# Patient Record
Sex: FEMALE | Race: OTHER | HISPANIC OR LATINO
[De-identification: names, ages, dates, MRNs, and addresses within clinical notes are randomized per-mention and may not be internally consistent; named-entity substitution may affect disease eponyms.]

---

## 2018-06-18 ENCOUNTER — RESULT REVIEW (OUTPATIENT)
Age: 28
End: 2018-06-18

## 2020-05-18 ENCOUNTER — TRANSCRIPTION ENCOUNTER (OUTPATIENT)
Age: 30
End: 2020-05-18

## 2020-07-29 ENCOUNTER — OUTPATIENT (OUTPATIENT)
Dept: INPATIENT UNIT | Facility: HOSPITAL | Age: 30
LOS: 1 days | Discharge: HOME | End: 2020-07-29
Payer: COMMERCIAL

## 2020-07-29 ENCOUNTER — EMERGENCY (EMERGENCY)
Facility: HOSPITAL | Age: 30
LOS: 1 days | Discharge: STILL ADMITTED OR ADMINISTRATIVE | End: 2020-07-29
Admitting: EMERGENCY MEDICINE

## 2020-07-29 VITALS
RESPIRATION RATE: 18 BRPM | WEIGHT: 123.9 LBS | HEIGHT: 62 IN | TEMPERATURE: 99 F | SYSTOLIC BLOOD PRESSURE: 102 MMHG | HEART RATE: 90 BPM | DIASTOLIC BLOOD PRESSURE: 64 MMHG

## 2020-07-29 VITALS
HEART RATE: 92 BPM | OXYGEN SATURATION: 100 % | SYSTOLIC BLOOD PRESSURE: 119 MMHG | DIASTOLIC BLOOD PRESSURE: 83 MMHG | TEMPERATURE: 99 F | RESPIRATION RATE: 18 BRPM

## 2020-07-29 VITALS — TEMPERATURE: 99 F

## 2020-07-29 DIAGNOSIS — O26.899 OTHER SPECIFIED PREGNANCY RELATED CONDITIONS, UNSPECIFIED TRIMESTER: ICD-10-CM

## 2020-07-29 DIAGNOSIS — Z3A.00 WEEKS OF GESTATION OF PREGNANCY NOT SPECIFIED: ICD-10-CM

## 2020-07-29 LAB
APPEARANCE UR: CLEAR — SIGNIFICANT CHANGE UP
APTT BLD: 28.5 SEC — SIGNIFICANT CHANGE UP (ref 27–36.3)
BASOPHILS # BLD AUTO: 0.03 K/UL — SIGNIFICANT CHANGE UP (ref 0–0.2)
BASOPHILS NFR BLD AUTO: 0.3 % — SIGNIFICANT CHANGE UP (ref 0–2)
BILIRUB UR-MCNC: NEGATIVE — SIGNIFICANT CHANGE UP
BLD GP AB SCN SERPL QL: NEGATIVE — SIGNIFICANT CHANGE UP
BLOOD UR QL VISUAL: NEGATIVE — SIGNIFICANT CHANGE UP
COLOR SPEC: SIGNIFICANT CHANGE UP
EOSINOPHIL # BLD AUTO: 0.38 K/UL — SIGNIFICANT CHANGE UP (ref 0–0.5)
EOSINOPHIL NFR BLD AUTO: 4.1 % — SIGNIFICANT CHANGE UP (ref 0–6)
FIBRINOGEN PPP-MCNC: 597 MG/DL — HIGH (ref 300–520)
GLUCOSE UR-MCNC: NEGATIVE — SIGNIFICANT CHANGE UP
HCT VFR BLD CALC: 34.3 % — LOW (ref 34.5–45)
HGB BLD-MCNC: 12.1 G/DL — SIGNIFICANT CHANGE UP (ref 11.5–15.5)
IMM GRANULOCYTES NFR BLD AUTO: 0.5 % — SIGNIFICANT CHANGE UP (ref 0–1.5)
INR BLD: 0.97 — SIGNIFICANT CHANGE UP (ref 0.88–1.17)
KETONES UR-MCNC: NEGATIVE — SIGNIFICANT CHANGE UP
LEUKOCYTE ESTERASE UR-ACNC: NEGATIVE — SIGNIFICANT CHANGE UP
LYMPHOCYTES # BLD AUTO: 2.07 K/UL — SIGNIFICANT CHANGE UP (ref 1–3.3)
LYMPHOCYTES # BLD AUTO: 22.2 % — SIGNIFICANT CHANGE UP (ref 13–44)
MCHC RBC-ENTMCNC: 31.4 PG — SIGNIFICANT CHANGE UP (ref 27–34)
MCHC RBC-ENTMCNC: 35.3 % — SIGNIFICANT CHANGE UP (ref 32–36)
MCV RBC AUTO: 89.1 FL — SIGNIFICANT CHANGE UP (ref 80–100)
MONOCYTES # BLD AUTO: 0.39 K/UL — SIGNIFICANT CHANGE UP (ref 0–0.9)
MONOCYTES NFR BLD AUTO: 4.2 % — SIGNIFICANT CHANGE UP (ref 2–14)
NEUTROPHILS # BLD AUTO: 6.41 K/UL — SIGNIFICANT CHANGE UP (ref 1.8–7.4)
NEUTROPHILS NFR BLD AUTO: 68.7 % — SIGNIFICANT CHANGE UP (ref 43–77)
NITRITE UR-MCNC: NEGATIVE — SIGNIFICANT CHANGE UP
NRBC # FLD: 0 K/UL — SIGNIFICANT CHANGE UP (ref 0–0)
PH UR: 6 — SIGNIFICANT CHANGE UP (ref 5–8)
PLATELET # BLD AUTO: 236 K/UL — SIGNIFICANT CHANGE UP (ref 150–400)
PMV BLD: 12.1 FL — SIGNIFICANT CHANGE UP (ref 7–13)
PROT UR-MCNC: NEGATIVE — SIGNIFICANT CHANGE UP
PROTHROM AB SERPL-ACNC: 11.1 SEC — SIGNIFICANT CHANGE UP (ref 9.8–13.1)
RBC # BLD FETUS AUTO: 0.9 — CRITICAL HIGH
RBC # BLD: 3.85 M/UL — SIGNIFICANT CHANGE UP (ref 3.8–5.2)
RBC # FLD: 12.5 % — SIGNIFICANT CHANGE UP (ref 10.3–14.5)
RH IG SCN BLD-IMP: NEGATIVE — SIGNIFICANT CHANGE UP
RH IG SCN BLD-IMP: NEGATIVE — SIGNIFICANT CHANGE UP
SP GR SPEC: 1.01 — SIGNIFICANT CHANGE UP (ref 1–1.04)
UROBILINOGEN FLD QL: NORMAL — SIGNIFICANT CHANGE UP
WBC # BLD: 9.33 K/UL — SIGNIFICANT CHANGE UP (ref 3.8–10.5)
WBC # FLD AUTO: 9.33 K/UL — SIGNIFICANT CHANGE UP (ref 3.8–10.5)

## 2020-07-29 PROCEDURE — 99203 OFFICE O/P NEW LOW 30 MIN: CPT | Mod: 25

## 2020-07-29 PROCEDURE — 76816 OB US FOLLOW-UP PER FETUS: CPT | Mod: 26

## 2020-07-29 PROCEDURE — 76817 TRANSVAGINAL US OBSTETRIC: CPT | Mod: 26

## 2020-07-29 NOTE — OB PROVIDER TRIAGE NOTE - NSOBPROVIDERNOTE_OBGYN_ALL_OB_FT
Labs resulted above. Patient is RH-(Negative); Rhogam ordered.   Dr Alva notified of above findings  Patient cleared to be discharge home after Rhogam administration. Labs resulted above. Patient is RH-(Negative); Rhogam ordered.   Dr Alva notified of above findings  Patient cleared to be discharge home after Rhogam administration.  Pt s/p rhogam administration. Information card provided to patient  Pt states OB appointment on Friday 7/31/20

## 2020-07-29 NOTE — OB PROVIDER TRIAGE NOTE - HISTORY OF PRESENT ILLNESS
[facility] Patient by default. [facility] Provider: Dr Roxy De La Torre  28 y/o  female, para 0010 @ 15+6 wks gestation, single IUP.  Presents to triage with c/o abdominal cramping all day yesterday. Woke up this morning at 12:30 am with ~1 TBSP of bright red vaginal bleeding in underwear that has since stopped, now sees brown spotting when wiped.    Pt denies any leakage of fluid. Denies any fetal movements yet for GA.     A/P Complications: Denies  Blood Transfusion: Patient will accept blood    Covid Assessment: Pt denies any: fever, chills, cough, SOB or any recent known exposure to Covid-19.    Allergies: NKDA  Meds: PNV  PMH: Denies  PSH: Denies  OBgynHx    2020-Chemical pregnancy  Pt denies any h/o: Abn. PAP, ovarian cysts, uterine fibroids, breast problems, STDs/STIs  PSY: Dx with   EtOH/ Smoke/ Recreational substance use: denies  FH: Not relevant to HPI  H/W/BMI: 6"/#/ [facility] Patient by default. [facility]NC Provider: Dr Roxy De La Torre  30 y/o  female, para 0010 @ 15+6 wks gestation, single IUP.  Presents to triage with c/o abdominal cramping all day yesterday. Woke up this morning at 12:30 am with ~1 TBSP of bright red vaginal bleeding in underwear that has since stopped, now sees brown spotting when wiped.    Pt denies any leakage of fluid. Denies any fetal movements yet for GA.     A/P Complications: Denies  Blood Transfusion: Patient will accept blood    Covid Assessment: Pt denies any: fever, chills, cough, SOB or any recent known exposure to Covid-19.    Allergies: NKDA  Meds: PNV  PMH: Denies  PSH: Denies  OBgynHx    2020-Chemical pregnancy  Pt denies any h/o: Abn. PAP, ovarian cysts, uterine fibroids, breast problems, STDs/STIs  PSY: Dx with Anxiety many years ago, denies any meds or therapy.   EtOH/ Smoke/ Recreational substance use: denies  FH: Not relevant to HPI  H/W/BMI: 62"/124#/22.7

## 2020-07-29 NOTE — OB PROVIDER TRIAGE NOTE - NS_OBGYNHISTORY_OBGYN_ALL_OB_FT
2020-Chemical pregnancy  Pt denies any h/o: Abn. PAP, ovarian cysts, uterine fibroids, breast problems, STDs/STIs

## 2020-07-29 NOTE — ED ADULT TRIAGE NOTE - CHIEF COMPLAINT QUOTE
Pt. reports she is 15 weeks and 6 days pregnant, , c/o vaginal bleeding when wiping. Also endorses intermittent abd cramping. Denies saturating pads. States she is due on . Spoke with Onelia from L&D who states pt. to be seen in L&D.

## 2020-07-29 NOTE — ED ADULT NURSE NOTE - NS ED NURSE NOTE DISPO AOU DETAILS FT
No acute distress at present. Pt. A&Ox4, ambulatory. Respirations even & unlabored on room air. Spoke with Onelia from L&D who states pt. to be seen in L&D. Pt. transported by ED tech.

## 2020-07-29 NOTE — OB PROVIDER TRIAGE NOTE - PLAN OF CARE
Patient cleared to be discharge home by Dr Alva  Follow up with Dr De La Torre on Friday 7/31/20 for routine OB care  Pelvic rest until cleared by OB  Return to triage for: leakage of fluid, vaginal bleeding, abdominal pain/cramping, or for any other concerns.   Bleeding precautions and pelvic rest reviewed with patient. Patient verbalized understanding

## 2020-07-29 NOTE — OB PROVIDER TRIAGE NOTE - ADDITIONAL INSTRUCTIONS
Follow up with Dr De L aTorre on Friday 7/31/20 for routine OB care  Pelvic rest until cleared by OB  Return to triage for: leakage of fluid, vaginal bleeding, abdominal pain/cramping, or for any other concerns.

## 2020-07-29 NOTE — OB PROVIDER TRIAGE NOTE - NSHPPHYSICALEXAM_GEN_ALL_CORE
30 y/o  female, para 0010 @ 15+6 wks gestation, single IUP.  Vaginal Bleeding  Gen: NAD; A+O x 3  Cardiac: S1/S2, R/R/R  Pulm: CTAB  Abdomen: Gravid, soft, non-tender  VS-BP: 102/64, P 90, RR 18, T 37.1, Pain 5/10  SSE: Scant dark brown leukorrhea noted on vaginal wall, no active bleeding noted, cervix appears C/L/P. Nitrazine Negative, Fern Negative  TVUS: Cervical length 3.84-4.04 cm, no funneling, no dynamic changes, Right Marginal Previa   Limited TAS: SLIUP, Cephalic, Posterior low lying placenta noted, multiple gross fetal movements visualized,  bpm with +color doppler flow, MVP 5.59 cm, size = dates, .95g  Plan:  CBC/Coags/T&S/KB/UA

## 2020-07-29 NOTE — OB PROVIDER TRIAGE NOTE - NSHPLABSRESULTS_GEN_ALL_CORE
Vital Signs Last 24 Hrs  T(C): 37.1 (29 Jul 2020 01:21), Max: 37.1 (29 Jul 2020 00:38)  T(F): 98.8 (29 Jul 2020 01:21), Max: 98.8 (29 Jul 2020 00:38)  HR: 90 (29 Jul 2020 01:21) (90 - 92)  BP: 102/64 (29 Jul 2020 01:21) (102/64 - 119/83)  BP(mean): --  RR: 18 (29 Jul 2020 01:21) (18 - 18)  SpO2: 100% (29 Jul 2020 00:38) (100% - 100%) Vital Signs Last 24 Hrs  T(C): 37.1 (:21), Max: 37.1 (2020 00:38)  T(F): 98.8 (:), Max: 98.8 (2020 00:38)  HR: 90 (:) (90 - 92)  BP: 102/64 (:) (102/64 - 119/83)  BP(mean): --  RR: 18 () (18 - 18)  SpO2: 100% (:38) (100% - 100%)                          12.1   9.33  )-----------( 236      ( 30 )             34.3     PT/INR - ( 30 )   PT: 11.1 SEC;   INR: 0.97          PTT - ( 30 )  PTT:28.5 SEC    Fibrinogen: 597    Urinalysis Basic - (  )    Color: LIGHT YELLOW / Appearance: CLEAR / S.013 / pH: 6.0  Gluc: NEGATIVE / Ketone: NEGATIVE  / Bili: NEGATIVE / Urobili: NORMAL   Blood: NEGATIVE / Protein: NEGATIVE / Nitrite: NEGATIVE   Leuk Esterase: NEGATIVE / RBC: x / WBC x   Sq Epi: x / Non Sq Epi: x / Bacteria: x    Blood Type: Vital Signs Last 24 Hrs  T(C): 37.1 (2020 01:21), Max: 37.1 (2020 00:38)  T(F): 98.8 (:21), Max: 98.8 (2020 00:38)  HR: 90 (:) (90 - 92)  BP: 102/64 (:) (102/64 - 119/83)  BP(mean): --  RR: 18 () (18 - 18)  SpO2: 100% (:38) (100% - 100%)                          12.1   9.33  )-----------( 236      ( 30 )             34.3     PT/INR - ( 30 )   PT: 11.1 SEC;   INR: 0.97          PTT - ( 30 )  PTT:28.5 SEC    Fibrinogen: 597    Urinalysis Basic - (  )    Color: LIGHT YELLOW / Appearance: CLEAR / S.013 / pH: 6.0  Gluc: NEGATIVE / Ketone: NEGATIVE  / Bili: NEGATIVE / Urobili: NORMAL   Blood: NEGATIVE / Protein: NEGATIVE / Nitrite: NEGATIVE   Leuk Esterase: NEGATIVE / RBC: x / WBC x   Sq Epi: x / Non Sq Epi: x / Bacteria: x    Blood Type: B-Negative

## 2023-02-22 PROBLEM — O03.9 COMPLETE OR UNSPECIFIED SPONTANEOUS ABORTION WITHOUT COMPLICATION: Chronic | Status: ACTIVE | Noted: 2020-07-29

## 2023-04-03 ENCOUNTER — APPOINTMENT (OUTPATIENT)
Dept: HUMAN REPRODUCTION | Facility: CLINIC | Age: 33
End: 2023-04-03

## 2023-05-15 ENCOUNTER — APPOINTMENT (OUTPATIENT)
Dept: HUMAN REPRODUCTION | Facility: CLINIC | Age: 33
End: 2023-05-15

## 2023-05-21 ENCOUNTER — EMERGENCY (EMERGENCY)
Facility: HOSPITAL | Age: 33
LOS: 1 days | Discharge: HOME | End: 2023-05-21
Admitting: EMERGENCY MEDICINE
Payer: COMMERCIAL

## 2023-05-21 VITALS
TEMPERATURE: 98 F | DIASTOLIC BLOOD PRESSURE: 64 MMHG | SYSTOLIC BLOOD PRESSURE: 100 MMHG | HEART RATE: 90 BPM | RESPIRATION RATE: 16 BRPM | OXYGEN SATURATION: 100 %

## 2023-05-21 PROCEDURE — 99284 EMERGENCY DEPT VISIT MOD MDM: CPT

## 2023-05-21 PROCEDURE — 93010 ELECTROCARDIOGRAM REPORT: CPT

## 2023-05-21 NOTE — ED ADULT TRIAGE NOTE - CHIEF COMPLAINT QUOTE
Pt c/o epigastric abd pain with nausea starting on Monday. Denies chest pain, sob, v/d, fevers/chills. Pmhx: Umbilical hernia

## 2023-05-22 LAB
ALBUMIN SERPL ELPH-MCNC: 4.5 G/DL — SIGNIFICANT CHANGE UP (ref 3.3–5)
ALP SERPL-CCNC: 147 U/L — HIGH (ref 40–120)
ALT FLD-CCNC: 13 U/L — SIGNIFICANT CHANGE UP (ref 4–33)
ANION GAP SERPL CALC-SCNC: 12 MMOL/L — SIGNIFICANT CHANGE UP (ref 7–14)
APPEARANCE UR: CLEAR — SIGNIFICANT CHANGE UP
AST SERPL-CCNC: 18 U/L — SIGNIFICANT CHANGE UP (ref 4–32)
BACTERIA # UR AUTO: NEGATIVE — SIGNIFICANT CHANGE UP
BASOPHILS # BLD AUTO: 0.02 K/UL — SIGNIFICANT CHANGE UP (ref 0–0.2)
BASOPHILS NFR BLD AUTO: 0.4 % — SIGNIFICANT CHANGE UP (ref 0–2)
BILIRUB SERPL-MCNC: 0.4 MG/DL — SIGNIFICANT CHANGE UP (ref 0.2–1.2)
BILIRUB UR-MCNC: NEGATIVE — SIGNIFICANT CHANGE UP
BUN SERPL-MCNC: 11 MG/DL — SIGNIFICANT CHANGE UP (ref 7–23)
CALCIUM SERPL-MCNC: 9.6 MG/DL — SIGNIFICANT CHANGE UP (ref 8.4–10.5)
CHLORIDE SERPL-SCNC: 105 MMOL/L — SIGNIFICANT CHANGE UP (ref 98–107)
CO2 SERPL-SCNC: 24 MMOL/L — SIGNIFICANT CHANGE UP (ref 22–31)
COLOR SPEC: SIGNIFICANT CHANGE UP
CREAT SERPL-MCNC: 0.62 MG/DL — SIGNIFICANT CHANGE UP (ref 0.5–1.3)
DIFF PNL FLD: NEGATIVE — SIGNIFICANT CHANGE UP
EGFR: 121 ML/MIN/1.73M2 — SIGNIFICANT CHANGE UP
EOSINOPHIL # BLD AUTO: 0.21 K/UL — SIGNIFICANT CHANGE UP (ref 0–0.5)
EOSINOPHIL NFR BLD AUTO: 4 % — SIGNIFICANT CHANGE UP (ref 0–6)
GLUCOSE SERPL-MCNC: 97 MG/DL — SIGNIFICANT CHANGE UP (ref 70–99)
GLUCOSE UR QL: NEGATIVE — SIGNIFICANT CHANGE UP
HCG SERPL-ACNC: <1 MIU/ML — SIGNIFICANT CHANGE UP
HCT VFR BLD CALC: 40.5 % — SIGNIFICANT CHANGE UP (ref 34.5–45)
HGB BLD-MCNC: 13.6 G/DL — SIGNIFICANT CHANGE UP (ref 11.5–15.5)
IANC: 3.53 K/UL — SIGNIFICANT CHANGE UP (ref 1.8–7.4)
IMM GRANULOCYTES NFR BLD AUTO: 0.2 % — SIGNIFICANT CHANGE UP (ref 0–0.9)
KETONES UR-MCNC: ABNORMAL
LEUKOCYTE ESTERASE UR-ACNC: NEGATIVE — SIGNIFICANT CHANGE UP
LIDOCAIN IGE QN: 41 U/L — SIGNIFICANT CHANGE UP (ref 7–60)
LYMPHOCYTES # BLD AUTO: 1.22 K/UL — SIGNIFICANT CHANGE UP (ref 1–3.3)
LYMPHOCYTES # BLD AUTO: 23 % — SIGNIFICANT CHANGE UP (ref 13–44)
MCHC RBC-ENTMCNC: 31 PG — SIGNIFICANT CHANGE UP (ref 27–34)
MCHC RBC-ENTMCNC: 33.6 GM/DL — SIGNIFICANT CHANGE UP (ref 32–36)
MCV RBC AUTO: 92.3 FL — SIGNIFICANT CHANGE UP (ref 80–100)
MONOCYTES # BLD AUTO: 0.31 K/UL — SIGNIFICANT CHANGE UP (ref 0–0.9)
MONOCYTES NFR BLD AUTO: 5.8 % — SIGNIFICANT CHANGE UP (ref 2–14)
NEUTROPHILS # BLD AUTO: 3.53 K/UL — SIGNIFICANT CHANGE UP (ref 1.8–7.4)
NEUTROPHILS NFR BLD AUTO: 66.6 % — SIGNIFICANT CHANGE UP (ref 43–77)
NITRITE UR-MCNC: NEGATIVE — SIGNIFICANT CHANGE UP
NRBC # BLD: 0 /100 WBCS — SIGNIFICANT CHANGE UP (ref 0–0)
NRBC # FLD: 0 K/UL — SIGNIFICANT CHANGE UP (ref 0–0)
PH UR: 7 — SIGNIFICANT CHANGE UP (ref 5–8)
PLATELET # BLD AUTO: 235 K/UL — SIGNIFICANT CHANGE UP (ref 150–400)
POTASSIUM SERPL-MCNC: 3.4 MMOL/L — LOW (ref 3.5–5.3)
POTASSIUM SERPL-SCNC: 3.4 MMOL/L — LOW (ref 3.5–5.3)
PROT SERPL-MCNC: 6.9 G/DL — SIGNIFICANT CHANGE UP (ref 6–8.3)
PROT UR-MCNC: ABNORMAL
RBC # BLD: 4.39 M/UL — SIGNIFICANT CHANGE UP (ref 3.8–5.2)
RBC # FLD: 11.8 % — SIGNIFICANT CHANGE UP (ref 10.3–14.5)
RBC CASTS # UR COMP ASSIST: SIGNIFICANT CHANGE UP /HPF (ref 0–4)
SODIUM SERPL-SCNC: 141 MMOL/L — SIGNIFICANT CHANGE UP (ref 135–145)
SP GR SPEC: 1.01 — SIGNIFICANT CHANGE UP (ref 1.01–1.05)
UROBILINOGEN FLD QL: SIGNIFICANT CHANGE UP
WBC # BLD: 5.3 K/UL — SIGNIFICANT CHANGE UP (ref 3.8–10.5)
WBC # FLD AUTO: 5.3 K/UL — SIGNIFICANT CHANGE UP (ref 3.8–10.5)
WBC UR QL: SIGNIFICANT CHANGE UP /HPF (ref 0–5)

## 2023-05-22 PROCEDURE — 76705 ECHO EXAM OF ABDOMEN: CPT | Mod: 26

## 2023-05-22 RX ORDER — SODIUM CHLORIDE 9 MG/ML
1000 INJECTION INTRAMUSCULAR; INTRAVENOUS; SUBCUTANEOUS ONCE
Refills: 0 | Status: COMPLETED | OUTPATIENT
Start: 2023-05-22 | End: 2023-05-22

## 2023-05-22 RX ORDER — FAMOTIDINE 10 MG/ML
1 INJECTION INTRAVENOUS
Qty: 40 | Refills: 0
Start: 2023-05-22

## 2023-05-22 RX ORDER — ONDANSETRON 8 MG/1
4 TABLET, FILM COATED ORAL ONCE
Refills: 0 | Status: COMPLETED | OUTPATIENT
Start: 2023-05-22 | End: 2023-05-22

## 2023-05-22 RX ORDER — FAMOTIDINE 10 MG/ML
20 INJECTION INTRAVENOUS ONCE
Refills: 0 | Status: COMPLETED | OUTPATIENT
Start: 2023-05-22 | End: 2023-05-22

## 2023-05-22 RX ADMIN — FAMOTIDINE 20 MILLIGRAM(S): 10 INJECTION INTRAVENOUS at 00:40

## 2023-05-22 RX ADMIN — SODIUM CHLORIDE 1000 MILLILITER(S): 9 INJECTION INTRAMUSCULAR; INTRAVENOUS; SUBCUTANEOUS at 00:40

## 2023-05-22 RX ADMIN — Medication 30 MILLILITER(S): at 00:40

## 2023-05-22 RX ADMIN — ONDANSETRON 4 MILLIGRAM(S): 8 TABLET, FILM COATED ORAL at 00:40

## 2023-05-22 NOTE — ED PROVIDER NOTE - PHYSICAL EXAMINATION
CONSTITUTIONAL: Well-appearing; well-nourished; in no apparent distress;  HEAD: Normocephalic, atraumatic;  EYES: conjunctiva and sclera WNL;  NECK/LYMPH: Supple  CARD: Normal S1, S2; no murmurs, rubs, or gallops noted  RESP: Normal chest excursion with respiration; breath sounds clear and equal bilaterally; no wheezes, rhonchi, or rales noted  ABD/GI: soft, non-distended; +RUQ ttp, + epigastric ttp. Negative CVA ttp b/l. no palpable organomegaly, no pulsatile mass  EXT/MS: moves all extremities  SKIN: Normal for age and race; warm; dry; good turgor; no apparent lesions or exudate noted  NEURO: Awake, alert, oriented x 3, no gross deficits, CN II-XII grossly intact, no motor or sensory deficit noted  PSYCH: Normal mood; appropriate affect

## 2023-05-22 NOTE — ED PROVIDER NOTE - CLINICAL SUMMARY MEDICAL DECISION MAKING FREE TEXT BOX
32-year-old female PMH of umbilical hernia, presenting to ED with epigastric pain, burning, constant worse when lying in bed at night associated with increased belching over the past week.  Patient states she has been taking Tums with no relief at home.  Patient states the pain intermittently radiates to her back. Patient denies any fevers, vomiting, diarrhea, chest pain, shortness of breath.  No prior history of EGD in the past.  No abdominal surgeries.     + RUQ and epigastric ttp  suspect gastritis vs gerd, will r/o cholecystitis vs pancreatitis  PLAN: labs, lipase, ua, hcg, fluids, gi cocktail, reassess

## 2023-05-22 NOTE — ED ADULT NURSE NOTE - OBJECTIVE STATEMENT
pt rcd to intake room 13 a&ox4 ambulatory c/o RUQ and epigastric pain associated with nausea since monday. pt denies vomiting/ diarrhea. pt abdomen soft and nondistended. pt past medical history of known umbilical hernia. 20g to the LAC placed. labs collected and sent. pt medicated as per md orders. pt denies chest pain, sob, nausea, vomiting, dizziness, fevers/chills. pt respirations even and unlabored with no accessory muscle use. pt appears in NAD and pending US

## 2023-05-22 NOTE — ED PROVIDER NOTE - NSCAREINITIATED _GEN_ER
Lia Bronson(PA) Humira Counseling:  I discussed with the patient the risks of adalimumab including but not limited to myelosuppression, immunosuppression, autoimmune hepatitis, demyelinating diseases, lymphoma, and serious infections.  The patient understands that monitoring is required including a PPD at baseline and must alert us or the primary physician if symptoms of infection or other concerning signs are noted.

## 2023-05-22 NOTE — ED PROVIDER NOTE - OBJECTIVE STATEMENT
32-year-old female PMH of umbilical hernia, presenting to ED with epigastric pain, burning, constant worse when lying in bed at night associated with increased belching over the past week.  Patient states she has been taking Tums with no relief at home.  Patient states the pain intermittently radiates to her back. Patient denies any fevers, vomiting, diarrhea, chest pain, shortness of breath.  No prior history of EGD in the past.  No abdominal surgeries.  LMP May 14.  Patient otherwise denies any dysuria, cough, sore throat, weakness, numbness, tingling.

## 2023-05-22 NOTE — ED ADULT NURSE NOTE - NSFALLUNIVINTERV_ED_ALL_ED
Bed/Stretcher in lowest position, wheels locked, appropriate side rails in place/Call bell, personal items and telephone in reach/Instruct patient to call for assistance before getting out of bed/chair/stretcher/Non-slip footwear applied when patient is off stretcher/[place] to call system/Physically safe environment - no spills, clutter or unnecessary equipment/Purposeful proactive rounding/Room/bathroom lighting operational, light cord in reach

## 2023-05-22 NOTE — ED PROVIDER NOTE - PATIENT PORTAL LINK FT
You can access the FollowMyHealth Patient Portal offered by [facility] by registering at the following website: [email or web address]. By joining [email or web address]’s FollowMyHealth portal, you will also be able to view your health information using other applications (apps) compatible with our system.

## 2023-05-23 LAB
CULTURE RESULTS: NO GROWTH — SIGNIFICANT CHANGE UP
SPECIMEN SOURCE: SIGNIFICANT CHANGE UP

## 2023-05-24 ENCOUNTER — APPOINTMENT (OUTPATIENT)
Dept: HUMAN REPRODUCTION | Facility: CLINIC | Age: 33
End: 2023-05-24
Payer: COMMERCIAL

## 2023-05-24 PROCEDURE — 99499PP: CUSTOM

## 2023-06-07 ENCOUNTER — APPOINTMENT (OUTPATIENT)
Dept: HUMAN REPRODUCTION | Facility: CLINIC | Age: 33
End: 2023-06-07
Payer: COMMERCIAL

## 2023-06-07 PROCEDURE — 58974P52: CUSTOM | Mod: 52

## 2023-06-07 PROCEDURE — 74740 X-RAY FEMALE GENITAL TRACT: CPT | Mod: NC

## 2023-06-07 PROCEDURE — 76831P: CUSTOM

## 2023-06-07 PROCEDURE — 76831 ECHO EXAM UTERUS: CPT | Mod: NC

## 2023-06-22 ENCOUNTER — APPOINTMENT (OUTPATIENT)
Dept: HUMAN REPRODUCTION | Facility: CLINIC | Age: 33
End: 2023-06-22

## 2023-07-18 ENCOUNTER — APPOINTMENT (OUTPATIENT)
Dept: HUMAN REPRODUCTION | Facility: CLINIC | Age: 33
End: 2023-07-18

## 2023-07-20 ENCOUNTER — APPOINTMENT (OUTPATIENT)
Dept: HUMAN REPRODUCTION | Facility: CLINIC | Age: 33
End: 2023-07-20
Payer: COMMERCIAL

## 2023-07-20 PROCEDURE — 99214P: CUSTOM

## 2023-10-09 ENCOUNTER — APPOINTMENT (OUTPATIENT)
Dept: HUMAN REPRODUCTION | Facility: CLINIC | Age: 33
End: 2023-10-09
Payer: COMMERCIAL

## 2023-10-09 PROCEDURE — 99213 OFFICE O/P EST LOW 20 MIN: CPT | Mod: NC

## 2023-10-09 PROCEDURE — 36415 COLL VENOUS BLD VENIPUNCTURE: CPT | Mod: NC

## 2023-10-09 PROCEDURE — S4042: CPT | Mod: NC

## 2023-10-09 PROCEDURE — 76830 TRANSVAGINAL US NON-OB: CPT | Mod: NC

## 2023-10-13 ENCOUNTER — APPOINTMENT (OUTPATIENT)
Dept: HUMAN REPRODUCTION | Facility: CLINIC | Age: 33
End: 2023-10-13
Payer: COMMERCIAL

## 2023-10-13 PROCEDURE — 99213 OFFICE O/P EST LOW 20 MIN: CPT | Mod: NC

## 2023-10-13 PROCEDURE — 76857 US EXAM PELVIC LIMITED: CPT | Mod: NC

## 2023-10-13 PROCEDURE — 36415 COLL VENOUS BLD VENIPUNCTURE: CPT | Mod: NC

## 2023-10-16 ENCOUNTER — APPOINTMENT (OUTPATIENT)
Dept: HUMAN REPRODUCTION | Facility: CLINIC | Age: 33
End: 2023-10-16
Payer: COMMERCIAL

## 2023-10-16 PROCEDURE — 99213 OFFICE O/P EST LOW 20 MIN: CPT | Mod: NC,25

## 2023-10-16 PROCEDURE — 76857 US EXAM PELVIC LIMITED: CPT | Mod: NC

## 2023-10-16 PROCEDURE — 36415 COLL VENOUS BLD VENIPUNCTURE: CPT | Mod: NC

## 2023-10-19 ENCOUNTER — APPOINTMENT (OUTPATIENT)
Dept: HUMAN REPRODUCTION | Facility: CLINIC | Age: 33
End: 2023-10-19
Payer: COMMERCIAL

## 2023-10-19 PROCEDURE — 76857 US EXAM PELVIC LIMITED: CPT | Mod: NC

## 2023-10-19 PROCEDURE — 99213 OFFICE O/P EST LOW 20 MIN: CPT | Mod: NC

## 2023-10-19 PROCEDURE — 36415 COLL VENOUS BLD VENIPUNCTURE: CPT | Mod: NC

## 2023-10-21 ENCOUNTER — APPOINTMENT (OUTPATIENT)
Dept: HUMAN REPRODUCTION | Facility: CLINIC | Age: 33
End: 2023-10-21
Payer: COMMERCIAL

## 2023-10-21 PROCEDURE — 36415 COLL VENOUS BLD VENIPUNCTURE: CPT | Mod: NC

## 2023-10-21 PROCEDURE — 76857 US EXAM PELVIC LIMITED: CPT | Mod: NC

## 2023-10-21 PROCEDURE — 99213 OFFICE O/P EST LOW 20 MIN: CPT | Mod: NC

## 2023-10-23 ENCOUNTER — APPOINTMENT (OUTPATIENT)
Dept: HUMAN REPRODUCTION | Facility: CLINIC | Age: 33
End: 2023-10-23
Payer: COMMERCIAL

## 2023-10-23 PROCEDURE — 99213 OFFICE O/P EST LOW 20 MIN: CPT | Mod: NC

## 2023-10-23 PROCEDURE — 36415 COLL VENOUS BLD VENIPUNCTURE: CPT | Mod: NC

## 2023-10-23 PROCEDURE — 76857 US EXAM PELVIC LIMITED: CPT | Mod: NC

## 2023-10-24 ENCOUNTER — APPOINTMENT (OUTPATIENT)
Dept: HUMAN REPRODUCTION | Facility: CLINIC | Age: 33
End: 2023-10-24
Payer: COMMERCIAL

## 2023-10-24 PROCEDURE — 76857 US EXAM PELVIC LIMITED: CPT | Mod: NC

## 2023-10-24 PROCEDURE — 36415 COLL VENOUS BLD VENIPUNCTURE: CPT | Mod: NC

## 2023-10-24 PROCEDURE — 99213 OFFICE O/P EST LOW 20 MIN: CPT | Mod: NC

## 2023-10-25 ENCOUNTER — APPOINTMENT (OUTPATIENT)
Dept: HUMAN REPRODUCTION | Facility: CLINIC | Age: 33
End: 2023-10-25
Payer: COMMERCIAL

## 2023-10-25 PROCEDURE — S4035P52: CUSTOM

## 2023-11-08 ENCOUNTER — APPOINTMENT (OUTPATIENT)
Dept: HUMAN REPRODUCTION | Facility: CLINIC | Age: 33
End: 2023-11-08
Payer: COMMERCIAL

## 2023-11-08 PROCEDURE — 36415 COLL VENOUS BLD VENIPUNCTURE: CPT | Mod: NC

## 2023-11-08 PROCEDURE — S4042: CPT | Mod: NC

## 2023-11-08 PROCEDURE — 99213 OFFICE O/P EST LOW 20 MIN: CPT | Mod: NC

## 2023-11-08 PROCEDURE — 76830 TRANSVAGINAL US NON-OB: CPT | Mod: NC

## 2023-11-16 ENCOUNTER — APPOINTMENT (OUTPATIENT)
Dept: HUMAN REPRODUCTION | Facility: CLINIC | Age: 33
End: 2023-11-16
Payer: COMMERCIAL

## 2023-11-16 PROCEDURE — 76857 US EXAM PELVIC LIMITED: CPT | Mod: NC

## 2023-11-16 PROCEDURE — 36415 COLL VENOUS BLD VENIPUNCTURE: CPT | Mod: NC

## 2023-11-16 PROCEDURE — 99213 OFFICE O/P EST LOW 20 MIN: CPT | Mod: NC

## 2023-11-18 ENCOUNTER — APPOINTMENT (OUTPATIENT)
Dept: HUMAN REPRODUCTION | Facility: CLINIC | Age: 33
End: 2023-11-18
Payer: COMMERCIAL

## 2023-11-18 PROCEDURE — 76857 US EXAM PELVIC LIMITED: CPT | Mod: NC

## 2023-11-18 PROCEDURE — 36415 COLL VENOUS BLD VENIPUNCTURE: CPT | Mod: NC

## 2023-11-18 PROCEDURE — 99213 OFFICE O/P EST LOW 20 MIN: CPT | Mod: NC

## 2023-11-19 ENCOUNTER — APPOINTMENT (OUTPATIENT)
Dept: HUMAN REPRODUCTION | Facility: CLINIC | Age: 33
End: 2023-11-19
Payer: COMMERCIAL

## 2023-11-19 PROCEDURE — 76857 US EXAM PELVIC LIMITED: CPT | Mod: NC

## 2023-11-19 PROCEDURE — 99213 OFFICE O/P EST LOW 20 MIN: CPT | Mod: NC

## 2023-11-19 PROCEDURE — 36415 COLL VENOUS BLD VENIPUNCTURE: CPT | Mod: NC

## 2023-11-20 ENCOUNTER — APPOINTMENT (OUTPATIENT)
Dept: HUMAN REPRODUCTION | Facility: CLINIC | Age: 33
End: 2023-11-20
Payer: COMMERCIAL

## 2023-11-20 PROCEDURE — S4035P: CUSTOM

## 2023-12-04 ENCOUNTER — APPOINTMENT (OUTPATIENT)
Dept: HUMAN REPRODUCTION | Facility: CLINIC | Age: 33
End: 2023-12-04
Payer: COMMERCIAL

## 2023-12-04 PROCEDURE — S4042: CPT | Mod: NC

## 2023-12-04 PROCEDURE — 76830 TRANSVAGINAL US NON-OB: CPT | Mod: NC

## 2023-12-04 PROCEDURE — 99213 OFFICE O/P EST LOW 20 MIN: CPT | Mod: NC

## 2023-12-04 PROCEDURE — 36415 COLL VENOUS BLD VENIPUNCTURE: CPT | Mod: NC

## 2023-12-11 ENCOUNTER — APPOINTMENT (OUTPATIENT)
Dept: HUMAN REPRODUCTION | Facility: CLINIC | Age: 33
End: 2023-12-11
Payer: COMMERCIAL

## 2023-12-11 PROCEDURE — 99213 OFFICE O/P EST LOW 20 MIN: CPT | Mod: NC

## 2023-12-11 PROCEDURE — 36415 COLL VENOUS BLD VENIPUNCTURE: CPT | Mod: NC

## 2023-12-11 PROCEDURE — 76857 US EXAM PELVIC LIMITED: CPT | Mod: NC

## 2023-12-13 ENCOUNTER — APPOINTMENT (OUTPATIENT)
Dept: HUMAN REPRODUCTION | Facility: CLINIC | Age: 33
End: 2023-12-13
Payer: COMMERCIAL

## 2023-12-13 PROCEDURE — 76857 US EXAM PELVIC LIMITED: CPT | Mod: NC

## 2023-12-13 PROCEDURE — 99213 OFFICE O/P EST LOW 20 MIN: CPT | Mod: NC

## 2023-12-13 PROCEDURE — 36415 COLL VENOUS BLD VENIPUNCTURE: CPT | Mod: NC

## 2023-12-14 ENCOUNTER — APPOINTMENT (OUTPATIENT)
Dept: HUMAN REPRODUCTION | Facility: CLINIC | Age: 33
End: 2023-12-14
Payer: COMMERCIAL

## 2023-12-14 PROCEDURE — 76857 US EXAM PELVIC LIMITED: CPT | Mod: NC

## 2023-12-14 PROCEDURE — 99213 OFFICE O/P EST LOW 20 MIN: CPT | Mod: NC

## 2023-12-14 PROCEDURE — 96372 THER/PROPH/DIAG INJ SC/IM: CPT | Mod: NC

## 2023-12-15 ENCOUNTER — APPOINTMENT (OUTPATIENT)
Dept: HUMAN REPRODUCTION | Facility: CLINIC | Age: 33
End: 2023-12-15
Payer: COMMERCIAL

## 2023-12-15 PROCEDURE — S4035P: CUSTOM

## 2023-12-26 ENCOUNTER — APPOINTMENT (OUTPATIENT)
Dept: HUMAN REPRODUCTION | Facility: CLINIC | Age: 33
End: 2023-12-26
Payer: COMMERCIAL

## 2023-12-26 PROCEDURE — 76857 US EXAM PELVIC LIMITED: CPT | Mod: NC

## 2023-12-26 PROCEDURE — 99213 OFFICE O/P EST LOW 20 MIN: CPT | Mod: NC

## 2023-12-26 PROCEDURE — 36415 COLL VENOUS BLD VENIPUNCTURE: CPT | Mod: NC

## 2024-01-04 ENCOUNTER — APPOINTMENT (OUTPATIENT)
Dept: HUMAN REPRODUCTION | Facility: CLINIC | Age: 34
End: 2024-01-04
Payer: COMMERCIAL

## 2024-01-04 PROCEDURE — 99213 OFFICE O/P EST LOW 20 MIN: CPT | Mod: 25

## 2024-01-04 PROCEDURE — 76817 TRANSVAGINAL US OBSTETRIC: CPT

## 2024-01-04 PROCEDURE — 36415 COLL VENOUS BLD VENIPUNCTURE: CPT

## 2024-01-10 ENCOUNTER — APPOINTMENT (OUTPATIENT)
Dept: HUMAN REPRODUCTION | Facility: CLINIC | Age: 34
End: 2024-01-10
Payer: COMMERCIAL

## 2024-01-10 PROCEDURE — 76817 TRANSVAGINAL US OBSTETRIC: CPT

## 2024-01-10 PROCEDURE — 99213 OFFICE O/P EST LOW 20 MIN: CPT | Mod: 25

## 2024-01-10 PROCEDURE — 36415 COLL VENOUS BLD VENIPUNCTURE: CPT

## 2024-01-11 ENCOUNTER — APPOINTMENT (OUTPATIENT)
Dept: HUMAN REPRODUCTION | Facility: CLINIC | Age: 34
End: 2024-01-11

## 2024-01-12 ENCOUNTER — APPOINTMENT (OUTPATIENT)
Dept: HUMAN REPRODUCTION | Facility: CLINIC | Age: 34
End: 2024-01-12

## 2024-01-17 ENCOUNTER — APPOINTMENT (OUTPATIENT)
Dept: HUMAN REPRODUCTION | Facility: CLINIC | Age: 34
End: 2024-01-17
Payer: COMMERCIAL

## 2024-01-17 PROCEDURE — 99213 OFFICE O/P EST LOW 20 MIN: CPT | Mod: 25

## 2024-01-17 PROCEDURE — 76817 TRANSVAGINAL US OBSTETRIC: CPT

## 2024-01-24 ENCOUNTER — APPOINTMENT (OUTPATIENT)
Dept: HUMAN REPRODUCTION | Facility: CLINIC | Age: 34
End: 2024-01-24
Payer: COMMERCIAL

## 2024-01-24 PROCEDURE — 76817 TRANSVAGINAL US OBSTETRIC: CPT

## 2024-01-24 PROCEDURE — 99213 OFFICE O/P EST LOW 20 MIN: CPT | Mod: 25

## 2024-02-13 ENCOUNTER — APPOINTMENT (OUTPATIENT)
Dept: OBGYN | Facility: CLINIC | Age: 34
End: 2024-02-13

## 2024-07-30 ENCOUNTER — NON-APPOINTMENT (OUTPATIENT)
Age: 34
End: 2024-07-30

## 2025-01-17 ENCOUNTER — NON-APPOINTMENT (OUTPATIENT)
Age: 35
End: 2025-01-17

## 2025-08-05 ENCOUNTER — APPOINTMENT (OUTPATIENT)
Dept: DERMATOLOGY | Facility: CLINIC | Age: 35
End: 2025-08-05